# Patient Record
Sex: FEMALE | Race: WHITE | NOT HISPANIC OR LATINO | URBAN - METROPOLITAN AREA
[De-identification: names, ages, dates, MRNs, and addresses within clinical notes are randomized per-mention and may not be internally consistent; named-entity substitution may affect disease eponyms.]

---

## 2018-08-22 ENCOUNTER — RECORDS - HEALTHEAST (OUTPATIENT)
Dept: LAB | Facility: CLINIC | Age: 48
End: 2018-08-22

## 2018-08-22 LAB
CHOLEST SERPL-MCNC: 219 MG/DL
FASTING STATUS PATIENT QL REPORTED: NO
HDLC SERPL-MCNC: 55 MG/DL
LDLC SERPL CALC-MCNC: 138 MG/DL
TRIGL SERPL-MCNC: 129 MG/DL

## 2018-11-09 ENCOUNTER — RECORDS - HEALTHEAST (OUTPATIENT)
Dept: ADMINISTRATIVE | Facility: OTHER | Age: 48
End: 2018-11-09

## 2018-11-09 ENCOUNTER — OFFICE VISIT - HEALTHEAST (OUTPATIENT)
Dept: CARDIOLOGY | Facility: CLINIC | Age: 48
End: 2018-11-09

## 2018-11-09 ENCOUNTER — AMBULATORY - HEALTHEAST (OUTPATIENT)
Dept: CARDIOLOGY | Facility: CLINIC | Age: 48
End: 2018-11-09

## 2018-11-09 DIAGNOSIS — R00.2 PALPITATIONS: ICD-10-CM

## 2018-11-09 DIAGNOSIS — R07.9 NONSPECIFIC CHEST PAIN: ICD-10-CM

## 2018-11-09 ASSESSMENT — MIFFLIN-ST. JEOR: SCORE: 1354.78

## 2018-12-14 ENCOUNTER — HOSPITAL ENCOUNTER (OUTPATIENT)
Dept: CARDIOLOGY | Facility: HOSPITAL | Age: 48
Discharge: HOME | End: 2018-12-14
Attending: INTERNAL MEDICINE

## 2018-12-14 LAB
CV STRESS CURRENT BP HE: NORMAL
CV STRESS CURRENT HR HE: 100
CV STRESS CURRENT HR HE: 101
CV STRESS CURRENT HR HE: 102
CV STRESS CURRENT HR HE: 102
CV STRESS CURRENT HR HE: 108
CV STRESS CURRENT HR HE: 109
CV STRESS CURRENT HR HE: 110
CV STRESS CURRENT HR HE: 110
CV STRESS CURRENT HR HE: 111
CV STRESS CURRENT HR HE: 112
CV STRESS CURRENT HR HE: 113
CV STRESS CURRENT HR HE: 121
CV STRESS CURRENT HR HE: 125
CV STRESS CURRENT HR HE: 128
CV STRESS CURRENT HR HE: 131
CV STRESS CURRENT HR HE: 132
CV STRESS CURRENT HR HE: 134
CV STRESS CURRENT HR HE: 143
CV STRESS CURRENT HR HE: 144
CV STRESS CURRENT HR HE: 146
CV STRESS CURRENT HR HE: 148
CV STRESS CURRENT HR HE: 149
CV STRESS CURRENT HR HE: 151
CV STRESS CURRENT HR HE: 154
CV STRESS CURRENT HR HE: 154
CV STRESS CURRENT HR HE: 156
CV STRESS CURRENT HR HE: 168
CV STRESS CURRENT HR HE: 170
CV STRESS CURRENT HR HE: 171
CV STRESS CURRENT HR HE: 90
CV STRESS CURRENT HR HE: 95
CV STRESS CURRENT HR HE: 98
CV STRESS DEVIATION TIME HE: NORMAL
CV STRESS ECHO PERCENT HR HE: NORMAL
CV STRESS EXERCISE STAGE HE: NORMAL
CV STRESS FINAL RESTING BP HE: NORMAL
CV STRESS FINAL RESTING HR HE: 98
CV STRESS MAX HR HE: 173
CV STRESS MAX TREADMILL GRADE HE: 18
CV STRESS MAX TREADMILL SPEED HE: 5
CV STRESS PEAK DIA BP HE: NORMAL
CV STRESS PEAK SYS BP HE: NORMAL
CV STRESS PHASE HE: NORMAL
CV STRESS PROTOCOL HE: NORMAL
CV STRESS RESTING PT POSITION HE: NORMAL
CV STRESS RESTING PT POSITION HE: NORMAL
CV STRESS ST DEVIATION AMOUNT HE: NORMAL
CV STRESS ST DEVIATION ELEVATION HE: NORMAL
CV STRESS ST EVELATION AMOUNT HE: NORMAL
CV STRESS TEST TYPE HE: NORMAL
CV STRESS TOTAL STAGE TIME MIN 1 HE: NORMAL
ECHO EJECTION FRACTION ESTIMATED: 60 %
STRESS ECHO BASELINE BP: NORMAL
STRESS ECHO BASELINE HR: 89
STRESS ECHO CALCULATED PERCENT HR: 102 %
STRESS ECHO LAST STRESS BP: NORMAL
STRESS ECHO LAST STRESS HR: 171
STRESS ECHO POST ESTIMATED WORKLOAD: 14.3
STRESS ECHO POST EXERCISE DUR MIN: 13
STRESS ECHO POST EXERCISE DUR SEC: 42
STRESS ECHO TARGET HR: 145

## 2021-05-30 ENCOUNTER — RECORDS - HEALTHEAST (OUTPATIENT)
Dept: ADMINISTRATIVE | Facility: CLINIC | Age: 51
End: 2021-05-30

## 2021-06-02 VITALS — WEIGHT: 157 LBS | HEIGHT: 67 IN | BODY MASS INDEX: 24.64 KG/M2

## 2021-06-16 PROBLEM — R00.2 PALPITATIONS: Status: ACTIVE | Noted: 2018-11-09

## 2021-06-16 PROBLEM — R07.9 NONSPECIFIC CHEST PAIN: Status: ACTIVE | Noted: 2018-11-09

## 2021-06-26 NOTE — PROGRESS NOTES
Progress Notes by Kareem Santana MD at 11/9/2018  1:50 PM     Author: Kareem Santana MD Service: -- Author Type: Physician    Filed: 11/12/2018  9:33 AM Encounter Date: 11/9/2018 Status: Signed    : Kareem Santana MD (Physician)           Click to link to [facility] Consultation Note    Thank you, Dr. Pepito Tucker, for asking Janina Riojas to meet with me in consultation today at the [facility] to evaluate chest discomfort and palpitations.     Assessment:    1. Nonspecific chest pain  Echo Stress Exercise   2. Palpitations         Plan:    1. We discussed her symptoms at length.  I counseled her that her chest discomfort is atypical of cardiac symptoms but given her concerns and the number of questions she asked I thought an exercise echocardiographic stress test would be helpful to exclude structural heart disease and significant physiologic problems such as inducible myocardial ischemia.  I advised her that I was highly optimistic that the test to be normal.  Her other problems of infrequent nocturnal racing heartbeats merit heart monitoring.  Given the infrequent nature even a 4-week ACT monitor may not be diagnostic.  An implanted loop recorder would be highly likely to be diagnostic but she is not ready to take that step.  She is going to think this over and will call us if the symptoms become more frequent and thus more likely to be recorded by a patient activated surface monitor or whether she would like to pursue an implanted loop recorder.  Either way, unless her echocardiogram shows structural heart disease, dangerous arrhythmias seem to be unlikely at this point.    An After Visit Summary was printed and given to the patient.    This visit comprised 60 minutes of time of which more than 50% was spent in counseling and care coordination.    Current History:    I met with Janina in the [facility]  "[facility] this afternoon as requested.  She was accompanied by her very supportive , Enrique.    Janina first noticed an occasional irregular heart beat four years ago. She states she was treated for GERD with omeprazole and this seemed to help. She reports ongoing digestive \"issues\".  She states these occasional irregular beats eventually resolved.      For the last year or so she has experienced racing heart beats at night that wake her up perhaps once every 1 or 2 months and her FitBit states her heart rate is 130 bpm at that time. She can feel cold and clammy then but has no other symptoms.  Janina states that she has not yet gone through menopause.    She feels short of breath at rest sometimes for no reason and states that laying on her left side always makes her feel as if her \"heart is being squeezed\".     She describes being able to hear her heartbeat some nights when she puts her ear on the pillow.    She went to the ER recently because of intermittent sharp chest pain that lasted for less than 5 seconds.  She did not tell the emergency physician but now tells me that after this she felt heaviness in both arms.  She did not have associated nausea, diaphoresis or dyspnea.    He states she went to see a gastroenterologist this morning for possible endoscopy and was advised that her symptoms were unlikely to be related to her stomach and that endoscopy would not be helpful.    She has no symptoms with brisk walking, lawn mowing, carrying heavy things and swimming.    Janina is employed as a  in [place], [place].    Patient Active Problem List   Diagnosis   ? Nonspecific chest pain   ? Palpitations       Past Medical History:  No past medical history on file.    Past Surgical History:  Past Surgical History:   Procedure Laterality Date   ? RHINOPLASTY      for broken nose       Family History:  Family History   Problem Relation Age of Onset   ? Dementia Mother 58     [place] " "body dementia and small strokes   ? No Medical Problems Sister    ? No Medical Problems Brother    ? Dementia Maternal Grandfather      late onset   ? Stroke Paternal Grandmother 55     left hemiparesis   ? Heart disease Paternal Uncle      unknown type   ? Mental retardation Paternal Uncle      from trauma after birth   ? Diabetes type II Paternal Aunt    ? Diabetes type II Paternal Aunt    ? No Medical Problems Brother    ? No Medical Problems Brother    ? No Medical Problems Son    ? No Medical Problems Daughter    ? No Medical Problems Daughter    ? CABG Neg Hx    ? Sudden death Neg Hx    ? ICD Neg Hx    ? Pacemaker Neg Hx        Social History:   reports that she has never smoked. She has never used smokeless tobacco. She reports that she drinks alcohol. She reports that she does not use illicit drugs.    Medications:  Outpatient Encounter Prescriptions as of 11/9/2018   Medication Sig Dispense Refill   ? b complex vitamins tablet Take 1 tablet by mouth daily.     ? gluc/chnd/om3/dha/epa/fish/str (GLUCOSAMINE CHONDROITIN PLUS ORAL) Take 1 capsule by mouth daily.     ? Lactobacillus rhamnosus GG (CULTURELLE) 10-15 Billion cell capsule Take 1 capsule by mouth daily.     ? multivitamin with minerals (THERA-M) 9 mg iron-400 mcg Tab tablet Take 1 tablet by mouth daily.     ? TURMERIC ROOT EXTRACT ORAL Take 1 capsule by mouth every evening.       No facility-administered encounter medications on file as of 11/9/2018.        Allergies:  Penicillins    Review of Systems:     General: Night Sweats  Eyes: WNL  Ears/Nose/Throat: WNL  Lungs: WNL  Heart: Chest Pain, Irregular Heartbeat  Stomach: Constipation, Heartburn  Bladder: WNL  Muscle/Joints: WNL  Skin: WNL  Nervous System: Dizziness  Mental Health: WNL     Blood: WNL    Objective:    Wt Readings from Last 5 Encounters:   11/09/18 157 lb (71.2 kg)      5' 7\" (1.702 m)  Body mass index is 24.59 kg/(m^2).  /72 (Patient Site: Left Arm, Patient Position: Sitting, " "Cuff Size: Adult Regular)  Pulse 76  Resp 16  Ht 5' 7\" (1.702 m)  Wt 157 lb (71.2 kg)  BMI 24.59 kg/m2     Physical Exam:    General Appearance: Alert and not in distress   HEENT: No scleral icterus; the tongue is midline and the mucous membranes are pink and moist   Neck: No cervical bruits, adenopathy, or thyromegaly; jugular venous pressure is less than 5 cm   Chest: The spine is straight and the chest is symmetric   Lungs: Respirations are unlabored; the lungs are clear to auscultation   Cardiovasular: Auscultation reveals normal first and second heart sounds with no murmurs, rubs, or gallops; the carotid, radial, femoral, and posterior tibial pulses are intact   Abdomen: No organomegaly, masses, bruits, or tenderness; bowel sounds are present   Extremities: No cyanosis, clubbing or edema   Skin: No xanthelasma   Neurologic: Mood and affect; concerned, but not anxious       Cardiac testing:    EKG: Sinus rhythm, normal EKG per my personal review.    Imaging:    Xr Chest 2 Views    Result Date: 10/25/2018  XR CHEST 2 VIEWS 10/25/2018 1:32 PM INDICATION: Cp COMPARISON: None. FINDINGS: Mild convex rightward thoracic scoliosis. Heart size appears normal. Lungs appear clear.      Lab Review:    Lab Results   Component Value Date     10/25/2018    K 3.8 10/25/2018     10/25/2018    CO2 25 10/25/2018    BUN 8 10/25/2018    CREATININE 0.82 10/25/2018    CALCIUM 9.2 10/25/2018     Lab Results   Component Value Date    WBC 8.8 10/25/2018    HGB 14.0 10/25/2018    HCT 42.5 10/25/2018     10/25/2018     10/25/2018     Lab Results   Component Value Date    CHOL 219 (H) 08/22/2018    TRIG 129 08/22/2018    HDL 55 08/22/2018     LDL Calculated (mg/dL)   Date Value   08/22/2018 138 (H)   05/13/2016 153 (H)           Much or all of the text in this note was generated through the use of the Dragon Dictate voice-to-text software. Errors in spelling or words which seem out of context are " unintentional. Sound alike errors, in particular, may have escaped editing.

## 2021-10-06 ENCOUNTER — LAB REQUISITION (OUTPATIENT)
Dept: LAB | Facility: CLINIC | Age: 51
End: 2021-10-06

## 2021-10-06 DIAGNOSIS — Z12.4 ENCOUNTER FOR SCREENING FOR MALIGNANT NEOPLASM OF CERVIX: ICD-10-CM

## 2021-10-06 PROCEDURE — 87624 HPV HI-RISK TYP POOLED RSLT: CPT | Performed by: PHYSICIAN ASSISTANT

## 2021-10-06 PROCEDURE — G0123 SCREEN CERV/VAG THIN LAYER: HCPCS | Performed by: PHYSICIAN ASSISTANT

## 2021-10-12 LAB
HUMAN PAPILLOMA VIRUS 16 DNA: NEGATIVE
HUMAN PAPILLOMA VIRUS 18 DNA: NEGATIVE
HUMAN PAPILLOMA VIRUS FINAL DIAGNOSIS: NORMAL
HUMAN PAPILLOMA VIRUS OTHER HR: NEGATIVE

## 2021-10-15 LAB
BKR LAB AP GYN ADEQUACY: NORMAL
BKR LAB AP GYN INTERPRETATION: NORMAL
BKR LAB AP HPV REFLEX: NORMAL
BKR LAB AP PREVIOUS ABNORMAL: NORMAL
PATH REPORT.COMMENTS IMP SPEC: NORMAL
PATH REPORT.RELEVANT HX SPEC: NORMAL

## 2022-12-14 ENCOUNTER — LAB REQUISITION (OUTPATIENT)
Dept: LAB | Facility: CLINIC | Age: 52
End: 2022-12-14

## 2022-12-14 DIAGNOSIS — E78.2 MIXED HYPERLIPIDEMIA: ICD-10-CM

## 2022-12-14 LAB
ALBUMIN SERPL BCG-MCNC: 4.2 G/DL (ref 3.5–5.2)
ALP SERPL-CCNC: 53 U/L (ref 35–104)
ALT SERPL W P-5'-P-CCNC: 19 U/L (ref 10–35)
ANION GAP SERPL CALCULATED.3IONS-SCNC: 9 MMOL/L (ref 7–15)
AST SERPL W P-5'-P-CCNC: 14 U/L (ref 10–35)
BILIRUB SERPL-MCNC: 0.4 MG/DL
BUN SERPL-MCNC: 12.9 MG/DL (ref 6–20)
CALCIUM SERPL-MCNC: 9.2 MG/DL (ref 8.6–10)
CHLORIDE SERPL-SCNC: 104 MMOL/L (ref 98–107)
CHOLEST SERPL-MCNC: 241 MG/DL
CREAT SERPL-MCNC: 0.85 MG/DL (ref 0.51–0.95)
DEPRECATED HCO3 PLAS-SCNC: 27 MMOL/L (ref 22–29)
GFR SERPL CREATININE-BSD FRML MDRD: 81 ML/MIN/1.73M2
GLUCOSE SERPL-MCNC: 98 MG/DL (ref 70–99)
HDLC SERPL-MCNC: 54 MG/DL
LDLC SERPL CALC-MCNC: 163 MG/DL
NONHDLC SERPL-MCNC: 187 MG/DL
POTASSIUM SERPL-SCNC: 4.1 MMOL/L (ref 3.4–5.3)
PROT SERPL-MCNC: 7 G/DL (ref 6.4–8.3)
SODIUM SERPL-SCNC: 140 MMOL/L (ref 136–145)
TRIGL SERPL-MCNC: 118 MG/DL
TSH SERPL DL<=0.005 MIU/L-ACNC: 3.32 UIU/ML (ref 0.3–4.2)

## 2022-12-14 PROCEDURE — 84443 ASSAY THYROID STIM HORMONE: CPT | Performed by: FAMILY MEDICINE

## 2022-12-14 PROCEDURE — 80053 COMPREHEN METABOLIC PANEL: CPT | Performed by: FAMILY MEDICINE

## 2022-12-14 PROCEDURE — 80061 LIPID PANEL: CPT | Performed by: FAMILY MEDICINE
